# Patient Record
Sex: FEMALE | Race: WHITE | NOT HISPANIC OR LATINO | URBAN - METROPOLITAN AREA
[De-identification: names, ages, dates, MRNs, and addresses within clinical notes are randomized per-mention and may not be internally consistent; named-entity substitution may affect disease eponyms.]

---

## 2023-10-04 ENCOUNTER — OFFICE (OUTPATIENT)
Dept: URBAN - METROPOLITAN AREA CLINIC 14 | Facility: CLINIC | Age: 63
End: 2023-10-04

## 2023-10-04 VITALS
DIASTOLIC BLOOD PRESSURE: 73 MMHG | WEIGHT: 85 LBS | SYSTOLIC BLOOD PRESSURE: 115 MMHG | RESPIRATION RATE: 16 BRPM | HEART RATE: 106 BPM | OXYGEN SATURATION: 95 % | HEIGHT: 62 IN

## 2023-10-04 DIAGNOSIS — T17.908A UNSPECIFIED FOREIGN BODY IN RESPIRATORY TRACT, PART UNSPECIF: ICD-10-CM

## 2023-10-04 DIAGNOSIS — R64 CACHEXIA: ICD-10-CM

## 2023-10-04 DIAGNOSIS — R13.10 DYSPHAGIA, UNSPECIFIED: ICD-10-CM

## 2023-10-04 DIAGNOSIS — K21.9 GASTRO-ESOPHAGEAL REFLUX DISEASE WITHOUT ESOPHAGITIS: ICD-10-CM

## 2023-10-04 PROCEDURE — 99203 OFFICE O/P NEW LOW 30 MIN: CPT | Performed by: INTERNAL MEDICINE

## 2023-10-04 NOTE — SERVICENOTES
I did have a long discussion with patient.  I told her that he had hiatal hernia repair is definitely not indicated it will be too hazardous to do in her current state.  I told her it is possible that with improvement in her nutrition and particularly if she is able to stop smoking cigarettes that the feeding tube may not be permanent.  Unfortunately at this juncture at do not see any way around proceeding with placing a feeding tube.  She promises to get that arranged with her local GI doctor in [facility].

## 2023-10-04 NOTE — SERVICEHPINOTES
Lilliana Sahu   is a   63   year old  female   here today  This had chili for 2nd opinion due to dysphagia and weight loss.  She states that she has had increased problems with difficulty swallowing as well as significant shortness of breath whenever she tries to eat.  This has resulted in food avoidance and progressive weight loss and she has lost about 100 lb.  She has had an endoscopy documenting a 4 cm hiatal hernia.  She also did have modified barium swallow and barium swallow documenting aspiration.  Of note she has documented COPD and emphysema and unfortunately has not been able to stop smoking cigarettes.  She has been very weak.